# Patient Record
Sex: FEMALE | Race: WHITE | HISPANIC OR LATINO | URBAN - METROPOLITAN AREA
[De-identification: names, ages, dates, MRNs, and addresses within clinical notes are randomized per-mention and may not be internally consistent; named-entity substitution may affect disease eponyms.]

---

## 2020-08-18 ENCOUNTER — APPOINTMENT (RX ONLY)
Dept: URBAN - METROPOLITAN AREA CLINIC 47 | Facility: CLINIC | Age: 40
Setting detail: DERMATOLOGY
End: 2020-08-18

## 2020-08-18 DIAGNOSIS — Z41.9 ENCOUNTER FOR PROCEDURE FOR PURPOSES OTHER THAN REMEDYING HEALTH STATE, UNSPECIFIED: ICD-10-CM

## 2020-08-18 PROCEDURE — ? XEOMIN

## 2020-08-18 NOTE — PROCEDURE: XEOMIN
Periorbital Skin Units: 0
Lot #: [id number]
Show Forehead Units: Yes
Show Right And Left Pupillary Line Units: No
Consent: Written consent obtained. Risks include but not limited to lid/brow ptosis, bruising, swelling, diplopia, temporary effect, incomplete chemical denervation.
Dilution (U/0.1 Cc): 4
Forehead Units: 9
Additional Area 1 Location: perioral
Post-Care Instructions: Patient instructed to not lie down for 4 hours and limit physical activity for 24 hours. Patient instructed not to travel by airplane for 48 hours.
Detail Level: Detailed
Expiration Date (Month Year): 08/2021

## 2020-11-18 ENCOUNTER — APPOINTMENT (RX ONLY)
Dept: URBAN - METROPOLITAN AREA CLINIC 47 | Facility: CLINIC | Age: 40
Setting detail: DERMATOLOGY
End: 2020-11-18

## 2020-11-18 DIAGNOSIS — Z41.9 ENCOUNTER FOR PROCEDURE FOR PURPOSES OTHER THAN REMEDYING HEALTH STATE, UNSPECIFIED: ICD-10-CM

## 2020-11-18 PROCEDURE — ? FILLERS

## 2020-11-18 PROCEDURE — ? XEOMIN

## 2020-11-18 NOTE — PROCEDURE: XEOMIN
Glabellar Complex Units: 15
Masseter Units: 0
Show Right And Left Periorbital Units: No
Post-Care Instructions: Patient instructed to not lie down for 4 hours and limit physical activity for 24 hours. Patient instructed not to travel by airplane for 48 hours.
Dilution (U/0.1 Cc): 3.5
Detail Level: Detailed
Show Additional Area 5: Yes
Price (Use Numbers Only, No Special Characters Or $): Nadya Quiroga 20
Additional Area 2 Location: Bunny lines
Forehead Units: 9
Consent: Written consent obtained. Risks include but not limited to lid/brow ptosis, bruising, swelling, diplopia, temporary effect, incomplete chemical denervation.
Lot #: [id number]
Additional Area 1 Location: periocular
Expiration Date (Month Year): 09/2022
Additional Area 6 Location: Upper Lip

## 2020-11-18 NOTE — PROCEDURE: FILLERS
Additional Area 3 Volume In Cc: 0
Detail Level: Detailed
Include Cannula Information In Note?: Yes
Include Cannula Information In Note?: No
Lot #: [id number]
Lot #: [id number]
Expiration Date (Month Year): 2021-06-09
Expiration Date (Month Year): 2021-07-20
Additional Area 1 Location: jawline
Lot #: [id number]
Additional Area 2 Location: neck
Expiration Date (Month Year): 2021-03-11
Filler: Juvederm Volbella XC
Consent: Written consent obtained. Risks include but not limited to bruising, beading, irregular texture, ulceration, infection, allergic reaction, scar formation, incomplete augmentation, temporary nature, procedural pain.
Vermilion Lips Filler Volume In Cc: 1
Post-Care Instructions: Patient instructed to apply ice to reduce swelling.
Anesthesia Type: 0.3% lidocaine (mixed within filler)
Lot #: [id number]
Expiration Date (Month Year): 2021-07-28

## 2021-01-25 ENCOUNTER — APPOINTMENT (RX ONLY)
Dept: URBAN - METROPOLITAN AREA CLINIC 47 | Facility: CLINIC | Age: 41
Setting detail: DERMATOLOGY
End: 2021-01-25

## 2021-01-25 DIAGNOSIS — Z41.9 ENCOUNTER FOR PROCEDURE FOR PURPOSES OTHER THAN REMEDYING HEALTH STATE, UNSPECIFIED: ICD-10-CM

## 2021-01-25 PROCEDURE — ? BOTOX

## 2021-01-25 PROCEDURE — ? FILLERS

## 2021-01-25 NOTE — PROCEDURE: MIPS QUALITY
Quality 130: Documentation Of Current Medications In The Medical Record: Current Medications Documented
Quality 431: Preventive Care And Screening: Unhealthy Alcohol Use - Screening: Patient screened for unhealthy alcohol use using a single question and scores less than 2 times per year
Detail Level: Detailed
Quality 110: Preventive Care And Screening: Influenza Immunization: Influenza Immunization Administered during Influenza season
Quality 226: Preventive Care And Screening: Tobacco Use: Screening And Cessation Intervention: Tobacco Screening not Performed for Medical Reasons

## 2021-01-25 NOTE — PROCEDURE: FILLERS
Marionette Lines Filler  Volume In Cc: 0
Post-Care Instructions: Patient instructed to apply ice to reduce swelling.
Detail Level: Detailed
Include Cannula Information In Note?: No
Expiration Date (Month Year): 2021-07-28
Vermilion Lips Filler Volume In Cc: 1
Lot #: [id number]
Additional Area 1 Location: fine lines lower cheeks chin
Lot #: [id number]
Lot #: [id number]
Expiration Date (Month Year): 2021-08-20
Expiration Date (Month Year): 2021-07-20
Expiration Date (Month Year): 2021-03-11
Additional Area 1 Location: cheeks
Additional Area 2 Location: neck
Filler: Juvederm Ultra Plus
Anesthesia Type: 0.3% lidocaine (mixed within filler)
Consent: Written consent obtained. Risks include but not limited to bruising, beading, irregular texture, ulceration, infection, allergic reaction, scar formation, incomplete augmentation, temporary nature, procedural pain.
Lot #: [id number]

## 2021-01-25 NOTE — PROCEDURE: BOTOX
Additional Area 4 Location: under eyes
Additional Area 2 Units: 0
Detail Level: Zone
Additional Area 5 Location: Lower Lip
Additional Area 6 Location: masseter
Show Additional Area 3: Yes
Show Right And Left Brow Units: No
Additional Area 3 Location: lip
Post-Care Instructions: Patient instructed to not lie down for 4 hours and limit physical activity for 24 hours. Patient instructed not to travel by airplane for 48 hours.  Paid 12units
Dilution (U/0.1 Cc): 1.7
Additional Area 2 Location: eyebrow
Forehead Units: 3
Consent: Written consent obtained. Risks include but not limited to lid/brow ptosis, bruising, swelling, diplopia, temporary effect, incomplete chemical denervation.
Expiration Date (Month Year): 09-23
Glabellar Complex Units: 9
Additional Area 1 Location: chin
Lot #: [id number]

## 2021-03-16 ENCOUNTER — APPOINTMENT (RX ONLY)
Dept: URBAN - METROPOLITAN AREA CLINIC 47 | Facility: CLINIC | Age: 41
Setting detail: DERMATOLOGY
End: 2021-03-16

## 2021-03-16 DIAGNOSIS — Z41.9 ENCOUNTER FOR PROCEDURE FOR PURPOSES OTHER THAN REMEDYING HEALTH STATE, UNSPECIFIED: ICD-10-CM

## 2021-03-16 PROCEDURE — ? BOTOX

## 2021-03-16 NOTE — PROCEDURE: BOTOX
Show Masseter Units: Yes
Show Right And Left Pupillary Line Units: No
Additional Area 5 Location: Lower Lip
Depressor Anguli Oris Units: 0
Forehead Units: 9
Consent: Written consent obtained. Risks include but not limited to lid/brow ptosis, bruising, swelling, diplopia, temporary effect, incomplete chemical denervation.
Additional Area 4 Location: under eyes
Additional Area 2 Units: 3
Additional Area 1 Location: Perinasal
Expiration Date (Month Year): 08/23
Post-Care Instructions: Patient instructed to not lie down for 4 hours and limit physical activity for 24 hours. Patient instructed not to travel by airplane for 48 hours.
Glabellar Complex Units: 6
Additional Area 3 Location: lip
Lot #: [id number] #5
Detail Level: Zone
Dilution (U/0.1 Cc): 3.5
Additional Area 6 Location: masseter
Additional Area 2 Location: Periocular

## 2021-05-19 ENCOUNTER — APPOINTMENT (RX ONLY)
Dept: URBAN - METROPOLITAN AREA CLINIC 47 | Facility: CLINIC | Age: 41
Setting detail: DERMATOLOGY
End: 2021-05-19

## 2021-05-19 DIAGNOSIS — Z41.9 ENCOUNTER FOR PROCEDURE FOR PURPOSES OTHER THAN REMEDYING HEALTH STATE, UNSPECIFIED: ICD-10-CM

## 2021-05-19 PROCEDURE — ? BOTOX

## 2021-05-19 PROCEDURE — ? COSMETIC CONSULTATION - KYBELLA

## 2021-05-19 PROCEDURE — ? MEDICAL CONSULTATION: INMODE MORPHEUS 8

## 2021-05-19 NOTE — PROCEDURE: BOTOX
Glabellar Complex Units: 12
Show Ucl Units: No
Show Levator Superior Units: Yes
Masseter Units: 0
Lot #: [id number] #10 & [id number] #[id number]
Post-Care Instructions: Patient instructed to not lie down for 4 hours and limit physical activity for 24 hours. Patient instructed not to travel by airplane for 48 hours.
Expiration Date (Month Year): 09/2023 & 07/2023
Additional Area 1 Location: Periocular
Detail Level: Zone
Dilution (U/0.1 Cc): 3.5
Periorbital Skin Units: 9
Consent: Written consent obtained. Risks include but not limited to lid/brow ptosis, bruising, swelling, diplopia, temporary effect, incomplete chemical denervation.
Additional Area 2 Location: eyebrow lift

## 2021-07-20 ENCOUNTER — APPOINTMENT (RX ONLY)
Dept: URBAN - METROPOLITAN AREA CLINIC 47 | Facility: CLINIC | Age: 41
Setting detail: DERMATOLOGY
End: 2021-07-20

## 2021-07-20 DIAGNOSIS — Z41.9 ENCOUNTER FOR PROCEDURE FOR PURPOSES OTHER THAN REMEDYING HEALTH STATE, UNSPECIFIED: ICD-10-CM

## 2021-07-20 PROCEDURE — ? BOTOX

## 2021-07-20 NOTE — PROCEDURE: BOTOX
Show Right And Left Pupillary Line Units: No
Levator Labii Superioris Units: 0
Show Additional Area 3: Yes
Expiration Date (Month Year): 10/2023
Dilution (U/0.1 Cc): 3.5
Post-Care Instructions: Patient instructed to not lie down for 4 hours and limit physical activity for 24 hours. Patient instructed not to travel by airplane for 48 hours.
Additional Area 1 Location: periocular
Glabellar Complex Units: 12
Lot #: [id number] #3
Additional Area 3 Location: Perioral
Additional Area 4 Location: vermilion border
Detail Level: Zone
Additional Area 5 Location: brow lift
Consent: Written consent obtained. Risks include but not limited to lid/brow ptosis, bruising, swelling, diplopia, temporary effect, incomplete chemical denervation.
Forehead Units: 9

## 2021-10-21 ENCOUNTER — APPOINTMENT (RX ONLY)
Dept: URBAN - METROPOLITAN AREA CLINIC 47 | Facility: CLINIC | Age: 41
Setting detail: DERMATOLOGY
End: 2021-10-21

## 2021-10-21 DIAGNOSIS — Z41.9 ENCOUNTER FOR PROCEDURE FOR PURPOSES OTHER THAN REMEDYING HEALTH STATE, UNSPECIFIED: ICD-10-CM

## 2021-10-21 PROCEDURE — ? BOTOX

## 2021-10-21 NOTE — PROCEDURE: BOTOX
Additional Area 4 Units: 0
Show Additional Area 2: Yes
Expiration Date (Month Year): 10/2023
Additional Area 1 Location: Periocular
Additional Area 5 Location: chin
Additional Area 6 Location: left brow lift
Post-Care Instructions: Patient instructed to not lie down for 4 hours and limit physical activity for 24 hours. Patient instructed not to travel by airplane for 48 hours.
Dilution (U/0.1 Cc): 3.5
Glabellar Complex Units: 12
Additional Area 4 Location: vermilion border
Show Mentalis Units: No
Detail Level: Zone
Additional Area 3 Location: Neck bands
Consent: Written consent obtained. Risks include but not limited to lid/brow ptosis, bruising, swelling, diplopia, temporary effect, incomplete chemical denervation.
Lot #: [id number]
Additional Area 2 Location: [facility]

## 2022-01-24 ENCOUNTER — APPOINTMENT (RX ONLY)
Dept: URBAN - METROPOLITAN AREA CLINIC 47 | Facility: CLINIC | Age: 42
Setting detail: DERMATOLOGY
End: 2022-01-24

## 2022-01-24 DIAGNOSIS — Z41.9 ENCOUNTER FOR PROCEDURE FOR PURPOSES OTHER THAN REMEDYING HEALTH STATE, UNSPECIFIED: ICD-10-CM

## 2022-01-24 PROCEDURE — ? BOTOX

## 2022-01-24 NOTE — PROCEDURE: BOTOX
Left Pupillary Line Units: 0
Show Additional Area 6: Yes
Lot #: [id number] #[id number]
Show Lcl Units: No
Additional Area 2 Location: neck
Additional Area 1 Location: eyebrow lift
Forehead Units: 12
Consent: Written consent obtained. Risks include but not limited to lid/brow ptosis, bruising, swelling, diplopia, temporary effect, incomplete chemical denervation.
Glabellar Complex Units: 15
Dilution (U/0.1 Cc): 3
Expiration Date (Month Year): 
Detail Level: Detailed
Periorbital Skin Units: 6
Post-Care Instructions: Patient instructed to not lie down for 4 hours and limit physical activity for 24 hours.

## 2022-05-13 ENCOUNTER — APPOINTMENT (RX ONLY)
Dept: URBAN - METROPOLITAN AREA CLINIC 47 | Facility: CLINIC | Age: 42
Setting detail: DERMATOLOGY
End: 2022-05-13

## 2022-05-13 DIAGNOSIS — Z41.9 ENCOUNTER FOR PROCEDURE FOR PURPOSES OTHER THAN REMEDYING HEALTH STATE, UNSPECIFIED: ICD-10-CM

## 2022-05-13 PROCEDURE — ? BOTOX

## 2022-05-13 NOTE — PROCEDURE: BOTOX
Left Pupillary Line Units: 0
Show Additional Area 6: Yes
Lot #: [id number] #5
Show Lcl Units: No
Additional Area 2 Location: neck
Additional Area 1 Location: eyebrow lift
Forehead Units: 12
Consent: Written consent obtained. Risks include but not limited to lid/brow ptosis, bruising, swelling, diplopia, temporary effect, incomplete chemical denervation.
Glabellar Complex Units: 15
Dilution (U/0.1 Cc): 3
Expiration Date (Month Year): 
Detail Level: Detailed
Periorbital Skin Units: 6
Post-Care Instructions: Patient instructed to not lie down for 4 hours and limit physical activity for 24 hours.